# Patient Record
Sex: FEMALE | Race: WHITE | Employment: OTHER | URBAN - NONMETROPOLITAN AREA
[De-identification: names, ages, dates, MRNs, and addresses within clinical notes are randomized per-mention and may not be internally consistent; named-entity substitution may affect disease eponyms.]

---

## 2023-11-28 ENCOUNTER — OFFICE VISIT (OUTPATIENT)
Dept: URGENT CARE | Facility: CLINIC | Age: 58
End: 2023-11-28
Payer: COMMERCIAL

## 2023-11-28 VITALS
RESPIRATION RATE: 17 BRPM | TEMPERATURE: 98 F | OXYGEN SATURATION: 99 % | HEART RATE: 76 BPM | DIASTOLIC BLOOD PRESSURE: 60 MMHG | SYSTOLIC BLOOD PRESSURE: 110 MMHG

## 2023-11-28 DIAGNOSIS — L02.232 CARBUNCLE OF BACK: Primary | ICD-10-CM

## 2023-11-28 PROCEDURE — 10060 I&D ABSCESS SIMPLE/SINGLE: CPT | Performed by: PHYSICIAN ASSISTANT

## 2023-11-28 PROCEDURE — G0382 LEV 3 HOSP TYPE B ED VISIT: HCPCS | Performed by: PHYSICIAN ASSISTANT

## 2023-11-28 PROCEDURE — 87077 CULTURE AEROBIC IDENTIFY: CPT | Performed by: PHYSICIAN ASSISTANT

## 2023-11-28 PROCEDURE — 87205 SMEAR GRAM STAIN: CPT | Performed by: PHYSICIAN ASSISTANT

## 2023-11-28 PROCEDURE — 87186 SC STD MICRODIL/AGAR DIL: CPT | Performed by: PHYSICIAN ASSISTANT

## 2023-11-28 PROCEDURE — 87070 CULTURE OTHR SPECIMN AEROBIC: CPT | Performed by: PHYSICIAN ASSISTANT

## 2023-11-28 RX ORDER — SULFAMETHOXAZOLE AND TRIMETHOPRIM 800; 160 MG/1; MG/1
1 TABLET ORAL EVERY 12 HOURS SCHEDULED
Qty: 14 TABLET | Refills: 0 | Status: SHIPPED | OUTPATIENT
Start: 2023-11-28 | End: 2023-12-05

## 2023-11-28 NOTE — PROGRESS NOTES
[facility]        NAME: Triny Nieves is a 60 y.o. female  : 1965    MRN: [id number]  DATE: 2023  TIME: 9:17 AM    Assessment and Plan   Carbuncle of back [L02.232]  1. Carbuncle of back  sulfamethoxazole-trimethoprim (BACTRIM DS) 800-160 mg per tablet    Ambulatory Referral to General Surgery    Wound culture and Gram stain            Patient Instructions     Patient Instructions   Incision and Drainage   WHAT YOU NEED TO KNOW:   Incision and drainage is a procedure to drain a pocket of fluid, such as pus or blood. DISCHARGE INSTRUCTIONS:   Call your doctor if:   You have red streaks or extreme pain near your wound. Blood soaks through your bandage. You have pain in your back, stomach, muscles, or joints. You have fever or chills. You feel more tired than usual.    Fluid builds up again and creates a pocket in the same area. Your wound becomes red, swollen, and painful. You have questions or concerns about your condition or care. Medicines: You may need any of the following:  NSAIDs , such as ibuprofen, help decrease swelling, pain, and fever. This medicine is available with or without a doctor's order. NSAIDs can cause stomach bleeding or kidney problems in certain people. If you take blood thinner medicine, always ask if NSAIDs are safe for you. Always read the medicine label and follow directions. Do not give these medicines to children younger than 6 months without direction from a healthcare provider. Prescription pain medicine  may be given. Ask your healthcare provider how to take this medicine safely. Some prescription pain medicines contain acetaminophen. Do not take other medicines that contain acetaminophen without talking to your healthcare provider. Too much acetaminophen may cause liver damage. Prescription pain medicine may cause constipation. Ask your healthcare provider how to prevent or treat constipation.      Take your medicine as directed. Contact your healthcare provider if you think your medicine is not helping or if you have side effects. Tell your provider if you are allergic to any medicine. Keep a list of the medicines, vitamins, and herbs you take. Include the amounts, and when and why you take them. Bring the list or the pill bottles to follow-up visits. Carry your medicine list with you in case of an emergency. Wound care:  Keep your wound clean and dry as directed by your healthcare provider:  [facility] your hands  before and after you touch or clean your wound. Flush or soak your wound  as directed. Check your wound  for signs of infection, such as redness, swelling, and pain. Change the packing or bandages  as directed. Ask for more information about what type of bandages to use. Elevate your wound: If your wound is on your arm or leg, keep it raised above the level of your heart as often as you can. This will help decrease swelling and pain. Prop your arm or leg on pillows or blankets to keep it elevated comfortably. Wear a splint as directed: You may need to wear a splint if your wound is on your hand, arm, or leg. A splint limits movement and helps your wound heal. Do not remove the splint until your healthcare provider says it is okay. Follow up with your doctor in 1 to 3 days, or as directed: You may need to return to have your incision cleaned and your bandages changed. Bring a list of your questions so you remember to ask them during your visits. © Copyright Paty Sanchez 2023 Information is for End User's use only and may not be sold, redistributed or otherwise used for commercial purposes. The above information is an  only. It is not intended as medical advice for individual conditions or treatments. Talk to your doctor, nurse or pharmacist before following any medical regimen to see if it is safe and effective for you.   Furunculosis and Carbunculosis   WHAT YOU NEED TO KNOW: Furunculosis and carbunculosis are skin infections that form lumps and pus, called furuncles and carbuncles. A furuncle (abscess) forms when a hair follicle and the skin surrounding it become infected. A carbuncle is made up of multiple furuncles, and goes much deeper into the skin. Furuncles and carbuncles are usually caused by bacteria. DISCHARGE INSTRUCTIONS:   Return to the emergency department if:   You have a fast heartbeat or chest pain. You have sudden trouble breathing. Your symptoms do not improve or are getting worse. Your wound has pus coming out or has a foul smell. Contact your healthcare provider if:   You have a fever. You have chills or a cough, or feel weak and achy. You have increased pain, redness, or swelling around the infected area. You have questions or concerns about your condition or care. Medicines: You may need any of the following:  Antibiotics  help treat a bacterial infection. Acetaminophen  decreases pain and fever. It is available without a doctor's order. Ask how much to take and how often to take it. Follow directions. Acetaminophen can cause liver damage if not taken correctly. NSAIDs , such as ibuprofen, help decrease swelling, pain, and fever. This medicine is available with or without a doctor's order. NSAIDs can cause stomach bleeding or kidney problems in certain people. If you take blood thinner medicine, always ask your healthcare provider if NSAIDs are safe for you. Always read the medicine label and follow directions. Take your medicine as directed. Contact your healthcare provider if you think your medicine is not helping or if you have side effects. Tell your provider if you are allergic to any medicine. Keep a list of the medicines, vitamins, and herbs you take. Include the amounts, and when and why you take them. Bring the list or the pill bottles to follow-up visits.  Carry your medicine list with you in case of an emergency. Self-care:   Apply a warm compress. A compress can decrease pain and swelling. It may also help drain pus and speed up healing. Apply a moist, warm compress for 30 minutes, 3 to 4 times a day or as directed. Care for your wound as directed. You may need to apply bandages with medicine on them. You may need to wash the wound carefully with soap and water. Dry the area and put on new, clean bandages as directed. Change your bandages when they get wet or dirty. Prevent the spread of furunculosis and carbunculosis:   Keep your skin clean. Wash your skin and hair every day. Wash your hands often. Use soap and water. Use germ-killing hand lotion or gel if soap and water are not available. Apply lotion or moisturizing creams to your skin regularly. Stop using them if they sting or irritate your skin. Avoid contact with other people's wounds. Keep any wounds clean and covered with clean, dry bandages until they heal. Place used bandages in a sealed plastic bag when you throw them away. Do not share personal items. Use your own towel, soap, clothes, and other personal items. Do not share these items with others. [address] [address] [facility] [facility] [address] correctly. Keep an infected person's laundry in a plastic bag until it is washed. Wash with detergent and hot water. Dry the items on the hot setting. Follow up with your doctor as directed:  Write down your questions so you remember to ask them during your visits. © Copyright Cyrus Cobb 2023 Information is for End User's use only and may not be sold, redistributed or otherwise used for commercial purposes. The above information is an  only. It is not intended as medical advice for individual conditions or treatments. Talk to your doctor, nurse or pharmacist before following any medical regimen to see if it is safe and effective for you. Follow up with PCP in 3-5 days. Proceed to  ER if symptoms worsen.     Chief Complaint     Chief Complaint   Patient presents with    boil on posterior back     Started about 7 days ago  Raised, reddened area on back           History of Present Illness       Patient presents the clinic for a raised pustule on her back for approximately 1 week. She states that she had similar symptoms several years ago that she squeezed herself. Symptoms did eventually resolve but returned again last week. She complains of redness but denies any drainage. Review of Systems   Review of Systems   Constitutional:  Negative for chills and fever. Musculoskeletal:  Negative for arthralgias and myalgias. Skin:  Positive for color change, rash and wound. Neurological:  Negative for dizziness and headaches. Current Medications       Current Outpatient Medications:     sulfamethoxazole-trimethoprim (BACTRIM DS) 800-160 mg per tablet, Take 1 tablet by mouth every 12 (twelve) hours for 7 days, Disp: 14 tablet, Rfl: 0    Current Allergies     Allergies as of 11/28/2023    (No Known Allergies)            The following portions of the patient's history were reviewed and updated as appropriate: allergies, current medications, past family history, past medical history, past social history, past surgical history and problem list.     History reviewed. No pertinent past medical history. History reviewed. No pertinent surgical history. History reviewed. No pertinent family history. Medications have been verified. Objective   /60   Pulse 76   Temp 98 °F (36.7 °C)   Resp 17   SpO2 99%        Physical Exam     Physical Exam  Skin:            Comments: -There is a raised carbuncle noted on the posterior back that is approximately 3 cm x 2 cm in size. The area has surrounding pustule noted. There is surrounding erythema that is 4 cm x 4 cm in size.        Incision and drain    Date/Time: 11/28/2023 9:00 AM    Performed by: Lelia Golden PA-C  Authorized by: Lelia Golden PA-C  Universal Protocol:  Consent: Verbal consent obtained. Consent given by: patient  Patient understanding: patient states understanding of the procedure being performed  Patient identity confirmed: verbally with patient    Patient location:  Clinic  Location:     Type:  Abscess    Location:  Trunk    Trunk location:  Back  Pre-procedure details:     Skin preparation:  Antiseptic wash and Betadine  Anesthesia (see MAR for exact dosages): Anesthesia method:  Topical application (cold spray)  Procedure details:     Complexity:  Simple    Incision types:  Cruciate    Scalpel blade:  11    Wound management:  Probed and deloculated    Drainage:  Bloody and purulent    Drainage amount: Moderate    Wound treatment:  Wound left open  Post-procedure details:     Patient tolerance of procedure: Tolerated well, no immediate complications          -Wound culture was sent.   The patient will follow-up with her PCP or go to the ER if symptoms worsen

## 2023-11-28 NOTE — PATIENT INSTRUCTIONS
Incision and Drainage   WHAT YOU NEED TO KNOW:   Incision and drainage is a procedure to drain a pocket of fluid, such as pus or blood. DISCHARGE INSTRUCTIONS:   Call your doctor if:   You have red streaks or extreme pain near your wound. Blood soaks through your bandage. You have pain in your back, stomach, muscles, or joints. You have fever or chills. You feel more tired than usual.    Fluid builds up again and creates a pocket in the same area. Your wound becomes red, swollen, and painful. You have questions or concerns about your condition or care. Medicines: You may need any of the following:  NSAIDs , such as ibuprofen, help decrease swelling, pain, and fever. This medicine is available with or without a doctor's order. NSAIDs can cause stomach bleeding or kidney problems in certain people. If you take blood thinner medicine, always ask if NSAIDs are safe for you. Always read the medicine label and follow directions. Do not give these medicines to children younger than 6 months without direction from a healthcare provider. Prescription pain medicine  may be given. Ask your healthcare provider how to take this medicine safely. Some prescription pain medicines contain acetaminophen. Do not take other medicines that contain acetaminophen without talking to your healthcare provider. Too much acetaminophen may cause liver damage. Prescription pain medicine may cause constipation. Ask your healthcare provider how to prevent or treat constipation. Take your medicine as directed. Contact your healthcare provider if you think your medicine is not helping or if you have side effects. Tell your provider if you are allergic to any medicine. Keep a list of the medicines, vitamins, and herbs you take. Include the amounts, and when and why you take them. Bring the list or the pill bottles to follow-up visits. Carry your medicine list with you in case of an emergency.     Wound care:  Keep your wound clean and dry as directed by your healthcare provider:  [facility] your hands  before and after you touch or clean your wound. Flush or soak your wound  as directed. Check your wound  for signs of infection, such as redness, swelling, and pain. Change the packing or bandages  as directed. Ask for more information about what type of bandages to use. Elevate your wound: If your wound is on your arm or leg, keep it raised above the level of your heart as often as you can. This will help decrease swelling and pain. Prop your arm or leg on pillows or blankets to keep it elevated comfortably. Wear a splint as directed: You may need to wear a splint if your wound is on your hand, arm, or leg. A splint limits movement and helps your wound heal. Do not remove the splint until your healthcare provider says it is okay. Follow up with your doctor in 1 to 3 days, or as directed: You may need to return to have your incision cleaned and your bandages changed. Bring a list of your questions so you remember to ask them during your visits. © Copyright Loletta Gosselin 2023 Information is for End User's use only and may not be sold, redistributed or otherwise used for commercial purposes. The above information is an  only. It is not intended as medical advice for individual conditions or treatments. Talk to your doctor, nurse or pharmacist before following any medical regimen to see if it is safe and effective for you. Furunculosis and Carbunculosis   WHAT YOU NEED TO KNOW:   Furunculosis and carbunculosis are skin infections that form lumps and pus, called furuncles and carbuncles. A furuncle (abscess) forms when a hair follicle and the skin surrounding it become infected. A carbuncle is made up of multiple furuncles, and goes much deeper into the skin. Furuncles and carbuncles are usually caused by bacteria.   DISCHARGE INSTRUCTIONS:   Return to the emergency department if:   You have a fast heartbeat or chest pain. You have sudden trouble breathing. Your symptoms do not improve or are getting worse. Your wound has pus coming out or has a foul smell. Contact your healthcare provider if:   You have a fever. You have chills or a cough, or feel weak and achy. You have increased pain, redness, or swelling around the infected area. You have questions or concerns about your condition or care. Medicines: You may need any of the following:  Antibiotics  help treat a bacterial infection. Acetaminophen  decreases pain and fever. It is available without a doctor's order. Ask how much to take and how often to take it. Follow directions. Acetaminophen can cause liver damage if not taken correctly. NSAIDs , such as ibuprofen, help decrease swelling, pain, and fever. This medicine is available with or without a doctor's order. NSAIDs can cause stomach bleeding or kidney problems in certain people. If you take blood thinner medicine, always ask your healthcare provider if NSAIDs are safe for you. Always read the medicine label and follow directions. Take your medicine as directed. Contact your healthcare provider if you think your medicine is not helping or if you have side effects. Tell your provider if you are allergic to any medicine. Keep a list of the medicines, vitamins, and herbs you take. Include the amounts, and when and why you take them. Bring the list or the pill bottles to follow-up visits. Carry your medicine list with you in case of an emergency. Self-care:   Apply a warm compress. A compress can decrease pain and swelling. It may also help drain pus and speed up healing. Apply a moist, warm compress for 30 minutes, 3 to 4 times a day or as directed. Care for your wound as directed. You may need to apply bandages with medicine on them. You may need to wash the wound carefully with soap and water. Dry the area and put on new, clean bandages as directed.  Change your bandages when they get wet or dirty. Prevent the spread of furunculosis and carbunculosis:   Keep your skin clean. Wash your skin and hair every day. Wash your hands often. Use soap and water. Use germ-killing hand lotion or gel if soap and water are not available. Apply lotion or moisturizing creams to your skin regularly. Stop using them if they sting or irritate your skin. Avoid contact with other people's wounds. Keep any wounds clean and covered with clean, dry bandages until they heal. Place used bandages in a sealed plastic bag when you throw them away. Do not share personal items. Use your own towel, soap, clothes, and other personal items. Do not share these items with others. [address] [facility] [address] correctly. Keep an infected person's laundry in a plastic bag until it is washed. Wash with detergent and hot water. Dry the items on the hot setting. Follow up with your doctor as directed:  Write down your questions so you remember to ask them during your visits. © Copyright [facility] 2023 Information is for End User's use only and may not be sold, redistributed or otherwise used for commercial purposes. The above information is an  only. It is not intended as medical advice for individual conditions or treatments. Talk to your doctor, nurse or pharmacist before following any medical regimen to see if it is safe and effective for you.

## 2023-12-01 LAB
BACTERIA WND AEROBE CULT: ABNORMAL
GRAM STN SPEC: ABNORMAL
GRAM STN SPEC: ABNORMAL